# Patient Record
Sex: MALE | URBAN - METROPOLITAN AREA
[De-identification: names, ages, dates, MRNs, and addresses within clinical notes are randomized per-mention and may not be internally consistent; named-entity substitution may affect disease eponyms.]

---

## 2024-01-29 ENCOUNTER — OFFICE VISIT (OUTPATIENT)
Dept: URBAN - METROPOLITAN AREA CLINIC 113 | Facility: CLINIC | Age: 64
End: 2024-01-29

## 2024-01-29 NOTE — HPI-TODAY'S VISIT:
63-year-old male is referred by Dr. Gustabo Truong for nonspecific reactive hepatitis.  A copy of today's visit will be forwarded to the referring provider. Per the referral, labs performed at [facility] showed reactive hepatitis A antibody and hepatitis B surface antibody.  He has history of right upper quadrant pain and significant family history of liver and digestive issues.  He has also been referred to heme-onc due to other lab abnormalities.

## 2024-02-20 ENCOUNTER — OV EP (OUTPATIENT)
Dept: URBAN - METROPOLITAN AREA CLINIC 113 | Facility: CLINIC | Age: 64
End: 2024-02-20

## 2024-02-20 NOTE — HPI-TODAY'S VISIT:
63-year-old male is referred by Dr. Gustabo Truong for nonspecific reactive hepatitis.  A copy of today's visit will be forwarded to the referring provider. Per the referral, labs performed at [facility] showed reactive hepatitis A antibody and hepatitis B surface antibody.  He has history of right upper quadrant pain and significant family history of liver and digestive issues.  He has also been referred to heme-onc due to other lab abnormalities.

## 2024-02-21 ENCOUNTER — OV EP (OUTPATIENT)
Dept: URBAN - METROPOLITAN AREA CLINIC 113 | Facility: CLINIC | Age: 64
End: 2024-02-21

## 2024-03-25 ENCOUNTER — OV NP (OUTPATIENT)
Dept: URBAN - METROPOLITAN AREA CLINIC 113 | Facility: CLINIC | Age: 64
End: 2024-03-25

## 2024-03-25 NOTE — HPI-TODAY'S VISIT:
63-year-old male is referred by Dr. Gustabo Truong for nonspecific reactive hepatitis.  A copy of today's visit will be forwarded to the referring provider. Per the referral, labs performed at [facility] showed reactive hepatitis A antibody and hepatitis B surface antibody.  He has history of right upper quadrant pain and significant family history of liver and digestive issues.  He has also been referred to heme-onc due to other lab abnormalities.

## 2024-04-24 ENCOUNTER — OV EP (OUTPATIENT)
Dept: URBAN - METROPOLITAN AREA CLINIC 113 | Facility: CLINIC | Age: 64
End: 2024-04-24
Payer: MEDICARE

## 2024-04-24 VITALS
TEMPERATURE: 97.7 F | WEIGHT: 141 LBS | DIASTOLIC BLOOD PRESSURE: 89 MMHG | SYSTOLIC BLOOD PRESSURE: 149 MMHG | RESPIRATION RATE: 16 BRPM | BODY MASS INDEX: 23.49 KG/M2 | HEART RATE: 92 BPM | HEIGHT: 65 IN

## 2024-04-24 DIAGNOSIS — R74.8 ELEVATED LIVER ENZYMES: ICD-10-CM

## 2024-04-24 DIAGNOSIS — Z12.11 COLON CANCER SCREENING: ICD-10-CM

## 2024-04-24 PROCEDURE — 99243 OFF/OP CNSLTJ NEW/EST LOW 30: CPT

## 2024-04-24 NOTE — HPI-TODAY'S VISIT:
63-year-old male is referred by Dr. Gustabo Truong for nonspecific reactive hepatitis.  A copy of today's visit will be forwarded to the referring provider. Per the referral, labs performed at [facility] showed reactive hepatitis A antibody and hepatitis B surface antibody.  He has history of right upper quadrant pain and significant family history of liver and digestive issues.  He has also been referred to heme-onc due to other lab abnormalities.  Labs 12/21/2023: unremarkable CBC, normal CMP, total cholesterol 266, triglycerides 322, HFL 39, , normal PSA,  Labs 10/31/2023: elevated RF 74.3, elevated CRP 30, elevated ESR 45, Hgb 11.2, Hct 33.7, normal MCV, plts 495, ALT 53 otherwise normal LFTs.  His father was diagnosed with liver disease in his 60s. He may have one beer 2-3 times a week. He does not drink wine or liquor. Denies yellowing of eyes or skin. Denies abdominal pain, nausea or vomiting. Bowel movements are regular. Denies blood per rectum or melena. He has never had a colonoscopy. He had a Cologuard test within the last 5-6 months. This was normal. No family history of colon cancer. Denies nausea or vomiting.

## 2024-04-25 LAB
A/G RATIO: 1.5
ABSOLUTE BASOPHILS: 78
ABSOLUTE EOSINOPHILS: 348
ABSOLUTE LYMPHOCYTES: 1764
ABSOLUTE MONOCYTES: 468
ABSOLUTE NEUTROPHILS: 3342
ALBUMIN: 4.2
ALKALINE PHOSPHATASE: 60
ALT (SGPT): 14
AST (SGOT): 17
BASOPHILS: 1.3
BILIRUBIN, TOTAL: 0.3
BUN/CREATININE RATIO: (no result)
BUN: 17
CALCIUM: 9.2
CARBON DIOXIDE, TOTAL: 28
CHLORIDE: 102
CREATININE: 0.76
EGFR: 100
EOSINOPHILS: 5.8
GLOBULIN, TOTAL: 2.8
GLUCOSE: 105
HEMATOCRIT: 38.4
HEMOGLOBIN: 12.9
HEP A AB, IGM: (no result)
HEPATITIS A AB, TOTAL: REACTIVE
HEPATITIS B CORE AB TOTAL: REACTIVE
HEPATITIS B SURFACE ANTIBODY QL: REACTIVE
HEPATITIS B SURFACE ANTIGEN: (no result)
HEPATITIS C ANTIBODY: (no result)
LYMPHOCYTES: 29.4
MCH: 29.4
MCHC: 33.6
MCV: 87.5
MONOCYTES: 7.8
MPV: 9.5
NEUTROPHILS: 55.7
PLATELET COUNT: 283
POTASSIUM: 4
PROTEIN, TOTAL: 7
RDW: 13.7
RED BLOOD CELL COUNT: 4.39
SODIUM: 138
WHITE BLOOD CELL COUNT: 6